# Patient Record
Sex: MALE | Race: WHITE
[De-identification: names, ages, dates, MRNs, and addresses within clinical notes are randomized per-mention and may not be internally consistent; named-entity substitution may affect disease eponyms.]

---

## 2020-12-09 ENCOUNTER — HOSPITAL ENCOUNTER (EMERGENCY)
Dept: HOSPITAL 56 - MW.ED | Age: 50
LOS: 1 days | Discharge: HOME | End: 2020-12-10
Payer: COMMERCIAL
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
## 2020-12-09 DIAGNOSIS — S63.619A: Primary | ICD-10-CM

## 2020-12-09 DIAGNOSIS — L03.113: ICD-10-CM
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
## 2020-12-09 DIAGNOSIS — Z20.828: ICD-10-CM

## 2020-12-09 LAB
BUN SERPL-MCNC: 18 MG/DL (ref 7–18)
CHLORIDE SERPL-SCNC: 101 MMOL/L (ref 98–107)
CO2 SERPL-SCNC: 23.8 MMOL/L (ref 21–32)
GLUCOSE SERPL-MCNC: 196 MG/DL (ref 74–106)
POTASSIUM SERPL-SCNC: 4.2 MMOL/L (ref 3.5–5.1)
SODIUM SERPL-SCNC: 137 MMOL/L (ref 136–148)

## 2020-12-09 PROCEDURE — 80053 COMPREHEN METABOLIC PANEL: CPT

## 2020-12-09 PROCEDURE — 87040 BLOOD CULTURE FOR BACTERIA: CPT

## 2020-12-09 PROCEDURE — 86140 C-REACTIVE PROTEIN: CPT

## 2020-12-09 PROCEDURE — 96375 TX/PRO/DX INJ NEW DRUG ADDON: CPT

## 2020-12-09 PROCEDURE — 96365 THER/PROPH/DIAG IV INF INIT: CPT

## 2020-12-09 PROCEDURE — 83036 HEMOGLOBIN GLYCOSYLATED A1C: CPT

## 2020-12-09 PROCEDURE — 85025 COMPLETE CBC W/AUTO DIFF WBC: CPT

## 2020-12-09 PROCEDURE — 83605 ASSAY OF LACTIC ACID: CPT

## 2020-12-09 PROCEDURE — 36415 COLL VENOUS BLD VENIPUNCTURE: CPT

## 2020-12-09 PROCEDURE — 96367 TX/PROPH/DG ADDL SEQ IV INF: CPT

## 2020-12-09 PROCEDURE — 96366 THER/PROPH/DIAG IV INF ADDON: CPT

## 2020-12-09 PROCEDURE — 87635 SARS-COV-2 COVID-19 AMP PRB: CPT

## 2020-12-09 PROCEDURE — U0002 COVID-19 LAB TEST NON-CDC: HCPCS

## 2020-12-09 PROCEDURE — 73200 CT UPPER EXTREMITY W/O DYE: CPT

## 2020-12-09 PROCEDURE — 99284 EMERGENCY DEPT VISIT MOD MDM: CPT

## 2020-12-09 NOTE — EDM.PDOC
ED HPI GENERAL MEDICAL PROBLEM





- General


Stated Complaint: RIGHT ARM INJURY


Time Seen by Provider: 12/09/20 21:24


Source of Information: Reports: Patient


History Limitations: Reports: No Limitations





- History of Present Illness


INITIAL COMMENTS - FREE TEXT/NARRATIVE: 





50-year-old male presents with right hand and wrist pain that started this 

evening.  Pain is severe, localized to the right hand and wrist, radiates up to 

the right elbow, constant, sharp, exacerbated with range of motion, alleviated 

with immobilization.  He works at [employer] and does lifting.  He denies any 

injuries or cuts.





ROS: A 10-point review of systems, other than pertinent positives and negatives 

as stated per HPI, is otherwise negative





Past medical history: No additional pertinent history


Past Surgical history: No additional pertinent history


Social history: No additional pertinent history


Family history: No additional pertinent history


________________________________________________________________________________





PHYSICAL EXAM





General: AOx4, GCS = 15, moderate distress


HEENT: dry mucous membrane


Neck: supple, no meningismus, no Kernig or Brudzinski


Cardiac: S1S2 RRR


Respiratory: CTAB, no crackles or rales, no wheezing


Abdomen: Soft, nontender, no rebound or guarding, nondistended, no pulsatile 

mass.


Back: nontender


Musculoskeletal: NVI distally, tenderness over flexor sheath, fusiform digits, 

pain with passive extension of the fingers, fingers held in flexed position.  

Positive Kanavel sign to right hand


Neuro: No focal deficits, CN 2 - 12 WNL.











- Related Data


                                    Allergies











Allergy/AdvReac Type Severity Reaction Status Date / Time


 


No Known Allergies Allergy   Verified 12/09/20 21:45











Home Meds: 


                                    Home Meds





. [No Known Home Meds]  12/09/20 [History]











Review of Systems





- Review of Systems


Review Of Systems: See Below (see dictation)





ED EXAM, GENERAL





- Physical Exam


Exam: See Below (see dictation)





Course





- Vital Signs


Last Recorded V/S: 


                                Last Vital Signs











Temp  97.6 F   12/09/20 21:45


 


Pulse  84   12/09/20 23:53


 


Resp  16   12/09/20 23:53


 


BP  139/86   12/09/20 23:53


 


Pulse Ox  96   12/09/20 23:53














- Orders/Labs/Meds


Orders: 


                               Active Orders 24 hr











 Category Date Time Status


 


 CULTURE BLOOD [BC] Stat Lab  12/09/20 22:03 Received


 


 CULTURE BLOOD [BC] Stat Lab  12/09/20 22:08 Received


 


 Blood Culture x2 Reflex Set [OM.PC] Stat Oth  12/09/20 21:23 Ordered


 


 Blood Culture x2 Reflex Set [OM.PC] Stat Oth  12/09/20 22:26 Ordered











Labs: 


                                Laboratory Tests











  12/09/20 12/09/20 12/09/20 Range/Units





  21:59 21:59 21:59 


 


WBC  9.90    (4.0-11.0)  K/uL


 


RBC  5.07    (4.50-5.90)  M/uL


 


Hgb  14.9    (13.0-17.0)  g/dL


 


Hct  44.0    (38.0-50.0)  %


 


MCV  86.8    (80.0-98.0)  fL


 


MCH  29.4    (27.0-32.0)  pg


 


MCHC  33.9    (31.0-37.0)  g/dL


 


RDW Std Deviation  42.9    (28.0-62.0)  fl


 


RDW Coeff of Fanta  14    (11.0-15.0)  %


 


Plt Count  250    (150-400)  K/uL


 


MPV  10.60    (7.40-12.00)  fL


 


Neut % (Auto)  80.3 H    (48.0-80.0)  %


 


Lymph % (Auto)  16.3    (16.0-40.0)  %


 


Mono % (Auto)  2.3    (0.0-15.0)  %


 


Eos % (Auto)  0.9    (0.0-7.0)  %


 


Baso % (Auto)  0.2    (0.0-1.5)  %


 


Neut # (Auto)  8.0 H    (1.4-5.7)  K/uL


 


Lymph # (Auto)  1.6    (0.6-2.4)  K/uL


 


Mono # (Auto)  0.2    (0.0-0.8)  K/uL


 


Eos # (Auto)  0.1    (0.0-0.7)  K/uL


 


Baso # (Auto)  0.0    (0.0-0.1)  K/uL


 


Nucleated RBC %  0.0    /100WBC


 


Nucleated RBCs #  0    K/uL


 


Lactate   1.2   (0.20-2.00)  mmol/L


 


Sodium    137  (136-148)  mmol/L


 


Potassium    4.2  (3.5-5.1)  mmol/L


 


Chloride    101  ()  mmol/L


 


Carbon Dioxide    23.8  (21.0-32.0)  mmol/L


 


BUN    18  (7.0-18.0)  mg/dL


 


Creatinine    1.0  (0.8-1.3)  mg/dL


 


Est Cr Clr Drug Dosing    94.13  mL/min


 


Estimated GFR (MDRD)    > 60.0  ml/min


 


Glucose    196 H  ()  mg/dL


 


Hemoglobin A1c    (4.5 - 6.2) %


 


Calcium    8.8  (8.5-10.1)  mg/dL


 


Total Bilirubin    0.4  (0.2-1.0)  mg/dL


 


AST    24  (15-37)  IU/L


 


ALT    21  (14-63)  IU/L


 


Alkaline Phosphatase    140 H  ()  U/L


 


C-Reactive Protein    1.70 H  (0.00-0.90)  mg/dL


 


Total Protein    6.9  (6.4-8.2)  g/dL


 


Albumin    3.3 L  (3.4-5.0)  g/dL


 


Globulin    3.6  (2.6-4.0)  g/dL


 


Albumin/Globulin Ratio    0.9  (0.9-1.6)  


 


SARS-CoV-2 RNA (CAROL)     (NEGATIVE)  














  12/09/20 12/09/20 Range/Units





  21:59 22:45 


 


WBC    (4.0-11.0)  K/uL


 


RBC    (4.50-5.90)  M/uL


 


Hgb    (13.0-17.0)  g/dL


 


Hct    (38.0-50.0)  %


 


MCV    (80.0-98.0)  fL


 


MCH    (27.0-32.0)  pg


 


MCHC    (31.0-37.0)  g/dL


 


RDW Std Deviation    (28.0-62.0)  fl


 


RDW Coeff of Fanta    (11.0-15.0)  %


 


Plt Count    (150-400)  K/uL


 


MPV    (7.40-12.00)  fL


 


Neut % (Auto)    (48.0-80.0)  %


 


Lymph % (Auto)    (16.0-40.0)  %


 


Mono % (Auto)    (0.0-15.0)  %


 


Eos % (Auto)    (0.0-7.0)  %


 


Baso % (Auto)    (0.0-1.5)  %


 


Neut # (Auto)    (1.4-5.7)  K/uL


 


Lymph # (Auto)    (0.6-2.4)  K/uL


 


Mono # (Auto)    (0.0-0.8)  K/uL


 


Eos # (Auto)    (0.0-0.7)  K/uL


 


Baso # (Auto)    (0.0-0.1)  K/uL


 


Nucleated RBC %    /100WBC


 


Nucleated RBCs #    K/uL


 


Lactate    (0.20-2.00)  mmol/L


 


Sodium    (136-148)  mmol/L


 


Potassium    (3.5-5.1)  mmol/L


 


Chloride    ()  mmol/L


 


Carbon Dioxide    (21.0-32.0)  mmol/L


 


BUN    (7.0-18.0)  mg/dL


 


Creatinine    (0.8-1.3)  mg/dL


 


Est Cr Clr Drug Dosing    mL/min


 


Estimated GFR (MDRD)    ml/min


 


Glucose    ()  mg/dL


 


Hemoglobin A1c  5.7  (4.5 - 6.2) %


 


Calcium    (8.5-10.1)  mg/dL


 


Total Bilirubin    (0.2-1.0)  mg/dL


 


AST    (15-37)  IU/L


 


ALT    (14-63)  IU/L


 


Alkaline Phosphatase    ()  U/L


 


C-Reactive Protein    (0.00-0.90)  mg/dL


 


Total Protein    (6.4-8.2)  g/dL


 


Albumin    (3.4-5.0)  g/dL


 


Globulin    (2.6-4.0)  g/dL


 


Albumin/Globulin Ratio    (0.9-1.6)  


 


SARS-CoV-2 RNA (CAROL)   NEGATIVE  (NEGATIVE)  











Meds: 


Medications














Discontinued Medications














Generic Name Dose Route Start Last Admin





  Trade Name Freq  PRN Reason Stop Dose Admin


 


Lactated Ringer's  1,000 mls @ 999 mls/hr  12/09/20 21:29  12/09/20 22:09





  Ringers, Lactated  IV  12/09/20 22:29  999 mls/hr





  .BOLUS ONE   Administration


 


Ceftriaxone Sodium/Dextrose 1  50 mls @ 100 mls/hr  12/09/20 21:30  12/09/20 

23:51





  gm/ Premix  IV  12/09/20 21:59  100 mls/hr





  ONETIME ONE   Administration


 


Vancomycin HCl 1.5 gm/ Premix  300 mls @ 300 mls/hr  12/09/20 21:30  12/09/20 

22:11





  IV  12/09/20 21:31  300 mls/hr





  ONETIME ONE   Administration


 


Ketorolac Tromethamine  30 mg  12/10/20 00:00  12/10/20 00:21





  Toradol  IVPUSH  12/10/20 00:01  30 mg





  ONETIME ONE   Administration


 


Morphine Sulfate  4 mg  12/09/20 21:29  12/09/20 22:12





  Morphine  IVPUSH  12/09/20 21:30  4 mg





  ONETIME ONE   Administration


 


Ondansetron HCl  4 mg  12/09/20 21:29  12/09/20 22:08





  Zofran  IVPUSH  12/09/20 21:30  4 mg





  ONETIME ONE   Administration














- Re-Assessments/Exams


Free Text/Narrative Re-Assessment/Exam: 





12/10/20 00:25


Case discussed with Dr. Alfonso, orthopedic hand at [facility], 

recommends giving him a dose of IV antibiotics here, and have him be seen in the

 morning at the bone and joint clinic at [place] in the early morning before 10

 AM.  Keep NPO. I instructed the patient to follow up in the morning with [facility] 

[facility] clinic before 10am. 





________________________________________________________________________________





MEDICAL DECISION MAKING: I reviewed the patients past medical records, lab and 

radiographic findings. I discussed the case with the patient. My differential 

diagnosis included: Cellulitis, flexor tenosynovitis.


Patient is afebrile with no tachycardia or leukocytosis.  His right hand 

demonstrates Kanavel signs concerning for flexor tenosynovitis, I discussed the 

case with Dr. Alfonso on-call for orthopedic hand, he recommends seeing him in 

the clinic in 10 hours, he was given IV vancomycin and Rocephin in the ER.  He 

is to keep n.p.o. from now on in case if they decide to take him to surgery.  

Patient is agreeable with plan.








Departure





- Departure


Time of Disposition: 00:02


Disposition: Home, Self-Care 01


Condition: Good


Clinical Impression: 


 Flexor tenosynovitis of finger, Cellulitis of right hand








- Discharge Information


*PRESCRIPTION DRUG MONITORING PROGRAM REVIEWED*: Not Applicable


*COPY OF PRESCRIPTION DRUG MONITORING REPORT IN PATIENT OSBALDO: Not Applicable


Instructions:  Cellulitis, Adult


Referrals: 


Elías Alfonso MD [Ordering Only Provider] - 12/10/20 9:00 am


Forms:  ED Department Discharge


Additional Instructions: 


The need for follow-up, as well as the timing and circumstances, are variable 

depending upon the specifics of your emergency department visit.





If you don't have a primary care physician on staff, we will provide you with a 

referral. We always advise you to contact your personal physician following an 

emergency department visit to inform them of the circumstance of the visit and 

for follow-up with them and/or the need for any referrals to a consulting 

specialist.





The emergency department will also refer you to a specialist when appropriate. 

This referral assures that you have the opportunity for follow-up care with a 

specialist. All of these measure are taken in an effort to provide you with 

optimal care, which includes your follow-up.





Under all circumstances we always encourage you to contact your private 

physician who remains a resource for coordinating your care. When calling for 

follow-up care, please make the office aware that this follow-up is from your 

recent emergency room visit. If for any reason you are refused follow-up, please

contact the [facility] Emergency [facility]

at [phone] and asked to speak to the emergency department charge nurse.





You are to go to [facility] tomorrow b

efore 10 AM. 





[facility]


[address] [place] [address]


[phone]





Sepsis Event Note (ED)





- Focused Exam


Vital Signs: 


                                   Vital Signs











  Temp Pulse Resp BP Pulse Ox


 


 12/09/20 23:53   84  16  139/86  96


 


 12/09/20 22:48   81  16  150/102 H  97


 


 12/09/20 21:45  97.6 F  96  18  144/92 H  96














- My Orders


Last 24 Hours: 


My Active Orders





12/09/20 21:23


Blood Culture x2 Reflex Set [OM.PC] Stat 





12/09/20 22:03


CULTURE BLOOD [BC] Stat 





12/09/20 22:08


CULTURE BLOOD [BC] Stat 





12/09/20 22:26


Blood Culture x2 Reflex Set [OM.PC] Stat 














- Assessment/Plan


Last 24 Hours: 


My Active Orders





12/09/20 21:23


Blood Culture x2 Reflex Set [OM.PC] Stat 





12/09/20 22:03


CULTURE BLOOD [BC] Stat 





12/09/20 22:08


CULTURE BLOOD [BC] Stat 





12/09/20 22:26


Blood Culture x2 Reflex Set [OM.PC] Stat

## 2020-12-10 LAB — HBA1C BLD-MCNC: 5.7 %
